# Patient Record
Sex: FEMALE | Race: WHITE | Employment: FULL TIME | URBAN - METROPOLITAN AREA
[De-identification: names, ages, dates, MRNs, and addresses within clinical notes are randomized per-mention and may not be internally consistent; named-entity substitution may affect disease eponyms.]

---

## 2020-06-15 LAB
CHLAMYDIA, EXTERNAL: NEGATIVE
HBSAG, EXTERNAL: NEGATIVE
HIV, EXTERNAL: NEGATIVE
N. GONORRHEA, EXTERNAL: NEGATIVE
RPR, EXTERNAL: NORMAL
RUBELLA, EXTERNAL: NORMAL

## 2020-07-01 ENCOUNTER — HOSPITAL ENCOUNTER (EMERGENCY)
Age: 30
Discharge: HOME | End: 2020-07-01
Attending: EMERGENCY MEDICINE
Payer: COMMERCIAL

## 2020-07-01 ENCOUNTER — APPOINTMENT (OUTPATIENT)
Dept: ULTRASOUND IMAGING | Age: 30
End: 2020-07-01
Attending: EMERGENCY MEDICINE
Payer: COMMERCIAL

## 2020-07-01 VITALS
WEIGHT: 190 LBS | OXYGEN SATURATION: 100 % | BODY MASS INDEX: 29.82 KG/M2 | TEMPERATURE: 98.2 F | SYSTOLIC BLOOD PRESSURE: 116 MMHG | RESPIRATION RATE: 18 BRPM | HEIGHT: 67 IN | DIASTOLIC BLOOD PRESSURE: 69 MMHG | HEART RATE: 83 BPM

## 2020-07-01 DIAGNOSIS — O41.8X10 SUBCHORIONIC HEMORRHAGE OF PLACENTA IN FIRST TRIMESTER, SINGLE OR UNSPECIFIED FETUS: ICD-10-CM

## 2020-07-01 DIAGNOSIS — O46.8X1 SUBCHORIONIC HEMORRHAGE OF PLACENTA IN FIRST TRIMESTER, SINGLE OR UNSPECIFIED FETUS: ICD-10-CM

## 2020-07-01 DIAGNOSIS — O46.90 VAGINAL BLEEDING DURING PREGNANCY: Primary | ICD-10-CM

## 2020-07-01 LAB
ABO + RH BLD: NORMAL
ALBUMIN SERPL-MCNC: 3.9 G/DL (ref 3.4–5)
ALBUMIN/GLOB SERPL: 1.3 {RATIO} (ref 0.8–1.7)
ALP SERPL-CCNC: 40 U/L (ref 45–117)
ALT SERPL-CCNC: 22 U/L (ref 13–56)
ANION GAP SERPL CALC-SCNC: 6 MMOL/L (ref 3–18)
APPEARANCE UR: CLEAR
AST SERPL-CCNC: 10 U/L (ref 10–38)
BACTERIA URNS QL MICRO: ABNORMAL /HPF
BASOPHILS # BLD: 0 K/UL (ref 0–0.1)
BASOPHILS NFR BLD: 0 % (ref 0–2)
BILIRUB SERPL-MCNC: 0.3 MG/DL (ref 0.2–1)
BILIRUB UR QL: NEGATIVE
BUN SERPL-MCNC: 11 MG/DL (ref 7–18)
BUN/CREAT SERPL: 16 (ref 12–20)
CALCIUM SERPL-MCNC: 8.8 MG/DL (ref 8.5–10.1)
CHLORIDE SERPL-SCNC: 105 MMOL/L (ref 100–111)
CO2 SERPL-SCNC: 26 MMOL/L (ref 21–32)
COLOR UR: YELLOW
CREAT SERPL-MCNC: 0.7 MG/DL (ref 0.6–1.3)
DIFFERENTIAL METHOD BLD: ABNORMAL
EOSINOPHIL # BLD: 0.2 K/UL (ref 0–0.4)
EOSINOPHIL NFR BLD: 2 % (ref 0–5)
EPITH CASTS URNS QL MICRO: ABNORMAL /LPF (ref 0–5)
ERYTHROCYTE [DISTWIDTH] IN BLOOD BY AUTOMATED COUNT: 12.4 % (ref 11.6–14.5)
GLOBULIN SER CALC-MCNC: 2.9 G/DL (ref 2–4)
GLUCOSE SERPL-MCNC: 83 MG/DL (ref 74–99)
GLUCOSE UR STRIP.AUTO-MCNC: NEGATIVE MG/DL
HCG SERPL-ACNC: ABNORMAL MIU/ML (ref 0–10)
HCG UR QL: POSITIVE
HCT VFR BLD AUTO: 35.7 % (ref 35–45)
HGB BLD-MCNC: 12 G/DL (ref 12–16)
HGB UR QL STRIP: ABNORMAL
KETONES UR QL STRIP.AUTO: NEGATIVE MG/DL
LEUKOCYTE ESTERASE UR QL STRIP.AUTO: NEGATIVE
LYMPHOCYTES # BLD: 2 K/UL (ref 0.9–3.6)
LYMPHOCYTES NFR BLD: 22 % (ref 21–52)
MCH RBC QN AUTO: 29.1 PG (ref 24–34)
MCHC RBC AUTO-ENTMCNC: 33.6 G/DL (ref 31–37)
MCV RBC AUTO: 86.7 FL (ref 74–97)
MONOCYTES # BLD: 0.5 K/UL (ref 0.05–1.2)
MONOCYTES NFR BLD: 6 % (ref 3–10)
NEUTS SEG # BLD: 6.1 K/UL (ref 1.8–8)
NEUTS SEG NFR BLD: 70 % (ref 40–73)
NITRITE UR QL STRIP.AUTO: NEGATIVE
PH UR STRIP: 5.5 [PH] (ref 5–8)
PLATELET # BLD AUTO: 192 K/UL (ref 135–420)
PMV BLD AUTO: 10.2 FL (ref 9.2–11.8)
POTASSIUM SERPL-SCNC: 3.4 MMOL/L (ref 3.5–5.5)
PROT SERPL-MCNC: 6.8 G/DL (ref 6.4–8.2)
PROT UR STRIP-MCNC: NEGATIVE MG/DL
RBC # BLD AUTO: 4.12 M/UL (ref 4.2–5.3)
RBC #/AREA URNS HPF: ABNORMAL /HPF (ref 0–5)
SERVICE CMNT-IMP: NORMAL
SODIUM SERPL-SCNC: 137 MMOL/L (ref 136–145)
SP GR UR REFRACTOMETRY: 1.02 (ref 1–1.03)
UROBILINOGEN UR QL STRIP.AUTO: 0.2 EU/DL (ref 0.2–1)
WBC # BLD AUTO: 8.7 K/UL (ref 4.6–13.2)
WBC URNS QL MICRO: ABNORMAL /HPF (ref 0–5)
WET PREP GENITAL: NORMAL

## 2020-07-01 PROCEDURE — 81001 URINALYSIS AUTO W/SCOPE: CPT

## 2020-07-01 PROCEDURE — 87210 SMEAR WET MOUNT SALINE/INK: CPT

## 2020-07-01 PROCEDURE — 87661 TRICHOMONAS VAGINALIS AMPLIF: CPT

## 2020-07-01 PROCEDURE — 85025 COMPLETE CBC W/AUTO DIFF WBC: CPT

## 2020-07-01 PROCEDURE — 99284 EMERGENCY DEPT VISIT MOD MDM: CPT

## 2020-07-01 PROCEDURE — 76817 TRANSVAGINAL US OBSTETRIC: CPT

## 2020-07-01 PROCEDURE — 86900 BLOOD TYPING SEROLOGIC ABO: CPT

## 2020-07-01 PROCEDURE — 84702 CHORIONIC GONADOTROPIN TEST: CPT

## 2020-07-01 PROCEDURE — 80053 COMPREHEN METABOLIC PANEL: CPT

## 2020-07-01 PROCEDURE — 81025 URINE PREGNANCY TEST: CPT

## 2020-07-01 NOTE — ED PROVIDER NOTES
EMERGENCY DEPARTMENT HISTORY AND PHYSICAL EXAM    Date: 2020  Patient Name: Alida Cochran    History of Presenting Illness     Chief Complaint   Patient presents with    Vaginal Bleeding         History Provided By: Patient    Additional History (Context):   Alida Cochran is a 34 y.o. female with PMHX G3, P2 proximally 8.5 weeks pregnant presents to the emergency department C/O heavy vaginal bleeding that started at 6 PM.  Patient states that she had a confirmed IUP by her OB/GYN at approximately 6 weeks. Last menstrual period was end of April. She reports crampy lower abdominal pain. Pt denies fever, nausea or vomiting, and any other sxs or complaints. PCP: Kristi Hutchins MD    Current Outpatient Medications   Medication Sig Dispense Refill    prenatal multivit-ca-min-fe-fa tab Take  by mouth. Indications: PREGNANCY      ibuprofen (MOTRIN) 800 mg tablet Take 1 Tab by mouth every eight (8) hours as needed. Indications: PAIN 90 Tab 0       Past History     Past Medical History:  History reviewed. No pertinent past medical history. Past Surgical History:  History reviewed. No pertinent surgical history. Family History:  Family History   Problem Relation Age of Onset    Cancer Paternal Grandfather        Social History:  Social History     Tobacco Use    Smoking status: Former Smoker     Last attempt to quit: 2015     Years since quittin.6    Smokeless tobacco: Never Used   Substance Use Topics    Alcohol use: No    Drug use: No       Allergies:  No Known Allergies      Review of Systems   Review of Systems   Constitutional: Negative for chills and fever. HENT: Negative for congestion, ear pain, sinus pain and sore throat. Eyes: Negative for pain and visual disturbance. Respiratory: Negative for cough and shortness of breath. Cardiovascular: Negative for chest pain and leg swelling.    Gastrointestinal: Negative for abdominal pain, constipation, diarrhea, nausea and vomiting. Genitourinary: Positive for vaginal bleeding. Negative for dysuria, hematuria and vaginal discharge. Musculoskeletal: Negative for back pain and neck pain. Skin: Negative for rash and wound. Neurological: Negative for dizziness, tremors, weakness, light-headedness and numbness. All other systems reviewed and are negative. Physical Exam     Vitals:    07/01/20 1850 07/01/20 2111   BP: 134/75 126/73   Pulse: 90 83   Resp: 16 18   Temp: 98.2 °F (36.8 °C)    SpO2: 100% 100%   Weight: 86.2 kg (190 lb)    Height: 5' 7\" (1.702 m)      Physical Exam    Nursing note and vitals reviewed    Constitutional: Tearful young female, nontoxic  Head: Normocephalic, Atraumatic  Eyes: Pupils are equal, round, and reactive to light, EOMI  Neck: Supple, non-tender  Cardiovascular: Regular rate and rhythm, no murmurs, rubs, or gallops, + 2 radial pulses bilaterally  Chest: Normal work of breathing and chest excursion bilaterally  Lungs: Clear to ausculation bilaterally, no wheezes, no rhonchi  Abdomen: Soft, non tender, non distended, normoactive bowel sounds  Exam was chaperoned by Ramonita Dasilva, medical technician  Normal external genitalia. Minimal to moderate blood in the vault. Normal cervical appearance. On bimanual exam, no cervical, uterine, or adnexal tenderness. Back: No evidence of trauma or deformity  Extremities: No evidence of trauma or deformity, no LE edema.  No streaking erythema, vesicular lesions, ulcerations or bulla  Skin: Warm and dry, normal cap refill  Neuro: Alert and appropriate, CN intact, normal speech, moving all 4 extremities freely and symmetrically  Psychiatric: Normal mood and affect       Diagnostic Study Results     Labs -     Recent Results (from the past 12 hour(s))   URINALYSIS W/ RFLX MICROSCOPIC    Collection Time: 07/01/20  7:15 PM   Result Value Ref Range    Color YELLOW      Appearance CLEAR      Specific gravity 1.019 1.005 - 1.030      pH (UA) 5.5 5.0 - 8.0      Protein Negative NEG mg/dL    Glucose Negative NEG mg/dL    Ketone Negative NEG mg/dL    Bilirubin Negative NEG      Blood MODERATE (A) NEG      Urobilinogen 0.2 0.2 - 1.0 EU/dL    Nitrites Negative NEG      Leukocyte Esterase Negative NEG     URINE MICROSCOPIC ONLY    Collection Time: 07/01/20  7:15 PM   Result Value Ref Range    WBC 2 to 5 0 - 5 /hpf    RBC 10 to 25 0 - 5 /hpf    Epithelial cells 1+ 0 - 5 /lpf    Bacteria FEW (A) NEG /hpf   BLOOD TYPE, (ABO+RH)    Collection Time: 07/01/20  7:20 PM   Result Value Ref Range    ABO/Rh(D) B POSITIVE    CBC WITH AUTOMATED DIFF    Collection Time: 07/01/20  7:20 PM   Result Value Ref Range    WBC 8.7 4.6 - 13.2 K/uL    RBC 4.12 (L) 4.20 - 5.30 M/uL    HGB 12.0 12.0 - 16.0 g/dL    HCT 35.7 35.0 - 45.0 %    MCV 86.7 74.0 - 97.0 FL    MCH 29.1 24.0 - 34.0 PG    MCHC 33.6 31.0 - 37.0 g/dL    RDW 12.4 11.6 - 14.5 %    PLATELET 370 011 - 507 K/uL    MPV 10.2 9.2 - 11.8 FL    NEUTROPHILS 70 40 - 73 %    LYMPHOCYTES 22 21 - 52 %    MONOCYTES 6 3 - 10 %    EOSINOPHILS 2 0 - 5 %    BASOPHILS 0 0 - 2 %    ABS. NEUTROPHILS 6.1 1.8 - 8.0 K/UL    ABS. LYMPHOCYTES 2.0 0.9 - 3.6 K/UL    ABS. MONOCYTES 0.5 0.05 - 1.2 K/UL    ABS. EOSINOPHILS 0.2 0.0 - 0.4 K/UL    ABS. BASOPHILS 0.0 0.0 - 0.1 K/UL    DF AUTOMATED     METABOLIC PANEL, COMPREHENSIVE    Collection Time: 07/01/20  7:20 PM   Result Value Ref Range    Sodium 137 136 - 145 mmol/L    Potassium 3.4 (L) 3.5 - 5.5 mmol/L    Chloride 105 100 - 111 mmol/L    CO2 26 21 - 32 mmol/L    Anion gap 6 3.0 - 18 mmol/L    Glucose 83 74 - 99 mg/dL    BUN 11 7.0 - 18 MG/DL    Creatinine 0.70 0.6 - 1.3 MG/DL    BUN/Creatinine ratio 16 12 - 20      GFR est AA >60 >60 ml/min/1.73m2    GFR est non-AA >60 >60 ml/min/1.73m2    Calcium 8.8 8.5 - 10.1 MG/DL    Bilirubin, total 0.3 0.2 - 1.0 MG/DL    ALT (SGPT) 22 13 - 56 U/L    AST (SGOT) 10 10 - 38 U/L    Alk.  phosphatase 40 (L) 45 - 117 U/L    Protein, total 6.8 6.4 - 8.2 g/dL    Albumin 3.9 3.4 - 5.0 g/dL Globulin 2.9 2.0 - 4.0 g/dL    A-G Ratio 1.3 0.8 - 1.7     BETA HCG, QT    Collection Time: 07/01/20  7:20 PM   Result Value Ref Range    Beta HCG, QT 53,676 (H) 0 - 10 MIU/ML   HCG URINE, QL. - POC    Collection Time: 07/01/20  7:32 PM   Result Value Ref Range    Pregnancy test,urine (POC) Positive (A) NEG     WET PREP    Collection Time: 07/01/20  7:45 PM   Result Value Ref Range    Special Requests: NO SPECIAL REQUESTS      Wet prep NO YEAST,TRICHOMONAS OR CLUE CELLS NOTED         Radiologic Studies -   US OB TV W DOPPLER   Final Result   IMPRESSION:       1. Single viable intrauterine gestation with an estimated gestational age based   on today's measurements of 9 weeks and 1 day plus/-1 week. 2. Small subchorionic hemorrhage. Recommend imaging follow-up as clinically   indicated. CT Results  (Last 48 hours)    None        CXR Results  (Last 48 hours)    None            Medical Decision Making   I am the first provider for this patient. I reviewed the vital signs, available nursing notes, past medical history, past surgical history, family history and social history. Vital Signs-Reviewed the patient's vital signs. Pulse Oximetry Analysis -98.2 % on room air    Cardiac Monitor:  Rate: 90 bpm  Rhythm: Regular    Records Reviewed: Nursing Notes and Old Medical Records    Provider Notes:   34 y.o. female G3, P2 approximately 8 weeks pregnant who presents with vaginal bleeding. She is tearful but nontoxic. Benign abdominal exam.  Had confirmed ultrasound with IUP 2 weeks ago. Pelvic exam showing minimal to moderate amount of blood. Will check Rh status. Obtain transvaginal ultrasound to evaluate. Procedures:  Procedures    ED Course:   7:06 PM   Initial assessment performed. The patients presenting problems have been discussed, and they are in agreement with the care plan formulated and outlined with them.   I have encouraged them to ask questions as they arise throughout their visit. 9:24 PM  Patient is B+. hCG quant is 53,000. Ultrasound showing single viable intrauterine gestation with approximate gestational age of 9-week. Fetal heart tones in the 170s. Noted to have a subchorionic hemorrhage. Discussed findings with patient and urged close follow-up with her OB/GYN. Diagnosis and Disposition       DISCHARGE NOTE:  9:23 PM    Olimpia Dailey  results have been reviewed with her. She has been counseled regarding her diagnosis, treatment, and plan. She verbally conveys understanding and agreement of the signs, symptoms, diagnosis, treatment and prognosis and additionally agrees to follow up as discussed. She also agrees with the care-plan and conveys that all of her questions have been answered. I have also provided discharge instructions for her that include: educational information regarding their diagnosis and treatment, and list of reasons why they would want to return to the ED prior to their follow-up appointment, should her condition change. She has been provided with education for proper emergency department utilization. CLINICAL IMPRESSION:    1. Vaginal bleeding during pregnancy    2. Subchorionic hemorrhage of placenta in first trimester, single or unspecified fetus        PLAN:  1. D/C Home  2. Current Discharge Medication List        3.    Follow-up Information     Follow up With Specialties Details Why Contact Info    Brii Hopkins MD Internal Medicine Schedule an appointment as soon as possible for a visit in 2 days  [place] [phone] [facility]  Schedule an appointment as soon as possible for a visit in 2 days  [address] [facility] [address]    THE [facility] EMERGENCY DEPT Emergency Medicine  As needed if symptoms worsen [address] [facility] [id number]  [phone]        ____________________________________ Please note that this dictation was completed with [email or web address], the computer voice recognition software. Quite often unanticipated grammatical, syntax, homophones, and other interpretive errors are inadvertently transcribed by the computer software. Please disregard these errors. Please excuse any errors that have escaped final proofreading.

## 2020-07-02 NOTE — DISCHARGE INSTRUCTIONS
You were seen and evaluated in the Emergency Department. Please understand that your work up is not all encompassing and you should follow up with your primary care physician for further management and continuity of care. Please return to Emergency Department or seek medical attention immediately if you have acute worsening in your symptoms or develop chest pain, shortness of breath, repeated vomiting, fever, altered level of consciousness, coughing up blood, or start sweating and feel clammy. If you were prescribed any medicine for home, please take as prescribed by your health-care provider. If you were given any follow-up appointments or numbers to call, please do so as instructed. Avoid any tobacco products or excessive alcohol. Patient Education        Vaginal Bleeding During Pregnancy: Care Instructions  Your Care Instructions     Many women have some vaginal bleeding when they are pregnant. In some cases, the bleeding is not serious. And there aren't any more problems with the pregnancy. But sometimes bleeding is a sign of a more serious problem. This is more common if the bleeding is heavy or painful. Examples of more serious problems include miscarriage, an ectopic pregnancy, or a problem with the placenta. You may have to see your doctor again to be sure everything is okay. You may also need more tests to find the cause of the bleeding. For some women, home treatment is all they need. But it depends on what is causing the bleeding. Be sure to tell your doctor if you have any new symptoms or if your symptoms get worse. The doctor has checked you carefully, but problems can develop later. If you notice any problems or new symptoms, get medical treatment right away. Follow-up care is a key part of your treatment and safety. Be sure to make and go to all appointments, and call your doctor if you are having problems.  It's also a good idea to know your test results and keep a list of the medicines you take. How can you care for yourself at home? · If your doctor prescribed medicines, take them exactly as directed. Call your doctor if you think you are having a problem with your medicine. · Do not have sex until the bleeding stops and your doctor says it's okay. · Ask your doctor about other activities you can or can't do. · Get a lot of rest. Being pregnant can make you tired. · Eat a healthy diet. Include a lot of peas, beans, and leafy green vegetables. Talk to a dietitian if you need help planning your diet. · Do not use nonsteroidal anti-inflammatory drugs (NSAIDs), such as ibuprofen (Advil, Motrin), naproxen (Aleve), or aspirin, unless your doctor says it is okay. When should you call for help? [id number] anytime you think you may need emergency care. For example, call if:  · You passed out (lost consciousness). · You have severe vaginal bleeding. Call your doctor now or seek immediate medical care if:  · You have any vaginal bleeding. · You have pain in your belly or pelvis. · You think that you are in labor. · You have a sudden release of fluid from your vagina. · You notice that your baby has stopped moving or is moving much less than normal.  Watch closely for changes in your health, and be sure to contact your doctor if you have any questions or concerns. Where can you learn more? Go to http://jolie-nathaniel.info/  Enter A600 in the search box to learn more about \"Vaginal Bleeding During Pregnancy: Care Instructions. \"  Current as of: February 11, 2020               Content Version: 12.5  © 4178-4910 Healthwise, Incorporated. Care instructions adapted under license by [employer] (which disclaims liability or warranty for this information). If you have questions about a medical condition or this instruction, always ask your healthcare professional. [address] any warranty or liability for your use of this information.        Patient Education        Subchorionic Hematoma: Care Instructions  Your Care Instructions     A subchorionic hematoma or hemorrhage is bleeding under one of the membranes (chorion) that surrounds the embryo inside the uterus. It is a common cause of bleeding in early pregnancy. The main symptom is vaginal bleeding. But some women don't have symptoms. They may find out they have a hematoma during an ultrasound test.  In most cases, the bleeding goes away on its own. Most women go on to have a healthy baby. But in some cases, the bleeding is a sign of a miscarriage or other problem with the pregnancy. Your doctor may want to do a follow-up ultrasound. Follow-up care is a key part of your treatment and safety. Be sure to make and go to all appointments, and call your doctor if you are having problems. It's also a good idea to know your test results and keep a list of the medicines you take. How can you care for yourself at home? · Keep track of any bleeding, and follow the guidelines for when to call your doctor. · Keep in mind that some bleeding during the first trimester or an abnormal finding on an ultrasound may:  ? Not cause any problems for you or the baby. ? Turn out to be something more serious. But if this happens, it's best to find out early. Then you and your doctor can manage any complications sooner rather than later. When should you call for help? [id number] anytime you think you may need emergency care. For example, call if:  · You have sudden, severe pain in your belly or pelvis. · You passed out (lost consciousness). · You have severe vaginal bleeding. Call your doctor now or seek immediate medical care if:  · You are dizzy or lightheaded, or you feel like you may faint. · You have new or increased pain in your belly or pelvis. · You have new or more vaginal bleeding. · You have pain in the vaginal area. · You have a fever. · You think you may have passed tissue. Save any tissue that you pass. Take it to your doctor's office as soon as you can. Watch closely for changes in your health, and be sure to contact your doctor if:  · You have new or worse vaginal symptoms, such as pain, itching, or a discharge. · You do not get better as expected. Where can you learn more? Go to http://jolie-nathaniel.info/  Enter U775 in the search box to learn more about \"Subchorionic Hematoma: Care Instructions. \"  Current as of: February 11, 2020               Content Version: 12.5  © 8237-9032 Healthwise, Incorporated. Care instructions adapted under license by [employer] (which disclaims liability or warranty for this information). If you have questions about a medical condition or this instruction, always ask your healthcare professional. [address] any warranty or liability for your use of this information.

## 2020-07-07 LAB
C TRACH RRNA SPEC QL NAA+PROBE: NEGATIVE
N GONORRHOEA RRNA SPEC QL NAA+PROBE: NEGATIVE
SPECIMEN SOURCE: NORMAL
T VAGINALIS RRNA SPEC QL NAA+PROBE: NEGATIVE

## 2021-01-19 LAB — GRBS, EXTERNAL: NEGATIVE

## 2021-02-03 ENCOUNTER — ANESTHESIA (OUTPATIENT)
Dept: LABOR AND DELIVERY | Age: 31
End: 2021-02-03
Payer: COMMERCIAL

## 2021-02-03 ENCOUNTER — ANESTHESIA EVENT (OUTPATIENT)
Dept: LABOR AND DELIVERY | Age: 31
End: 2021-02-03
Payer: COMMERCIAL

## 2021-02-03 ENCOUNTER — HOSPITAL ENCOUNTER (INPATIENT)
Age: 31
LOS: 2 days | Discharge: HOME | End: 2021-02-05
Attending: OBSTETRICS & GYNECOLOGY | Admitting: OBSTETRICS & GYNECOLOGY
Payer: COMMERCIAL

## 2021-02-03 PROBLEM — Z3A.39 39 WEEKS GESTATION OF PREGNANCY: Status: ACTIVE | Noted: 2021-02-03

## 2021-02-03 LAB
ABO + RH BLD: NORMAL
BASOPHILS # BLD: 0 K/UL (ref 0–0.1)
BASOPHILS NFR BLD: 0 % (ref 0–2)
BLOOD GROUP ANTIBODIES SERPL: NORMAL
DIFFERENTIAL METHOD BLD: ABNORMAL
EOSINOPHIL # BLD: 0.1 K/UL (ref 0–0.4)
EOSINOPHIL NFR BLD: 1 % (ref 0–5)
ERYTHROCYTE [DISTWIDTH] IN BLOOD BY AUTOMATED COUNT: 12.7 % (ref 11.6–14.5)
HCT VFR BLD AUTO: 38.3 % (ref 35–45)
HGB BLD-MCNC: 13 G/DL (ref 12–16)
LYMPHOCYTES # BLD: 1.3 K/UL (ref 0.9–3.6)
LYMPHOCYTES NFR BLD: 10 % (ref 21–52)
MCH RBC QN AUTO: 29 PG (ref 24–34)
MCHC RBC AUTO-ENTMCNC: 33.9 G/DL (ref 31–37)
MCV RBC AUTO: 85.3 FL (ref 74–97)
MONOCYTES # BLD: 0.7 K/UL (ref 0.05–1.2)
MONOCYTES NFR BLD: 5 % (ref 3–10)
NEUTS SEG # BLD: 11.2 K/UL (ref 1.8–8)
NEUTS SEG NFR BLD: 84 % (ref 40–73)
PLATELET # BLD AUTO: 230 K/UL (ref 135–420)
PMV BLD AUTO: 10.8 FL (ref 9.2–11.8)
RBC # BLD AUTO: 4.49 M/UL (ref 4.2–5.3)
SPECIMEN EXP DATE BLD: NORMAL
WBC # BLD AUTO: 13.3 K/UL (ref 4.6–13.2)

## 2021-02-03 PROCEDURE — 85025 COMPLETE CBC W/AUTO DIFF WBC: CPT

## 2021-02-03 PROCEDURE — 4A1HX4Z MONITORING OF PRODUCTS OF CONCEPTION, CARDIAC ELECTRICAL ACTIVITY, EXTERNAL APPROACH: ICD-10-PCS | Performed by: OBSTETRICS & GYNECOLOGY

## 2021-02-03 PROCEDURE — 74011250637 HC RX REV CODE- 250/637: Performed by: ADVANCED PRACTICE MIDWIFE

## 2021-02-03 PROCEDURE — 75410000000 HC DELIVERY VAGINAL/SINGLE

## 2021-02-03 PROCEDURE — 77030007879 HC KT SPN EPDRL TELE -B: Performed by: ANESTHESIOLOGY

## 2021-02-03 PROCEDURE — 65270000029 HC RM PRIVATE

## 2021-02-03 PROCEDURE — 74011250636 HC RX REV CODE- 250/636: Performed by: ANESTHESIOLOGY

## 2021-02-03 PROCEDURE — 74011000250 HC RX REV CODE- 250: Performed by: ANESTHESIOLOGY

## 2021-02-03 PROCEDURE — 86901 BLOOD TYPING SEROLOGIC RH(D): CPT

## 2021-02-03 PROCEDURE — 76060000078 HC EPIDURAL ANESTHESIA

## 2021-02-03 PROCEDURE — 00HU33Z INSERTION OF INFUSION DEVICE INTO SPINAL CANAL, PERCUTANEOUS APPROACH: ICD-10-PCS | Performed by: ANESTHESIOLOGY

## 2021-02-03 PROCEDURE — 75410000003 HC RECOV DEL/VAG/CSECN EA 0.5 HR

## 2021-02-03 PROCEDURE — 75410000002 HC LABOR FEE PER 1 HR

## 2021-02-03 PROCEDURE — 74011250636 HC RX REV CODE- 250/636: Performed by: ADVANCED PRACTICE MIDWIFE

## 2021-02-03 RX ORDER — NALOXONE HYDROCHLORIDE 0.4 MG/ML
0.2 INJECTION, SOLUTION INTRAMUSCULAR; INTRAVENOUS; SUBCUTANEOUS AS NEEDED
Status: DISCONTINUED | OUTPATIENT
Start: 2021-02-03 | End: 2021-02-03 | Stop reason: HOSPADM

## 2021-02-03 RX ORDER — SODIUM CHLORIDE 0.9 % (FLUSH) 0.9 %
5-40 SYRINGE (ML) INJECTION EVERY 8 HOURS
Status: DISCONTINUED | OUTPATIENT
Start: 2021-02-03 | End: 2021-02-03 | Stop reason: HOSPADM

## 2021-02-03 RX ORDER — OXYTOCIN/RINGER'S LACTATE 30/500 ML
10 PLASTIC BAG, INJECTION (ML) INTRAVENOUS AS NEEDED
Status: COMPLETED | OUTPATIENT
Start: 2021-02-03 | End: 2021-02-03

## 2021-02-03 RX ORDER — BUTORPHANOL TARTRATE 2 MG/ML
2 INJECTION INTRAMUSCULAR; INTRAVENOUS
Status: DISCONTINUED | OUTPATIENT
Start: 2021-02-03 | End: 2021-02-03 | Stop reason: HOSPADM

## 2021-02-03 RX ORDER — AMOXICILLIN 250 MG
1 CAPSULE ORAL
Status: DISCONTINUED | OUTPATIENT
Start: 2021-02-03 | End: 2021-02-05 | Stop reason: HOSPADM

## 2021-02-03 RX ORDER — DIPHENHYDRAMINE HYDROCHLORIDE 50 MG/ML
25 INJECTION, SOLUTION INTRAMUSCULAR; INTRAVENOUS
Status: DISCONTINUED | OUTPATIENT
Start: 2021-02-03 | End: 2021-02-03 | Stop reason: HOSPADM

## 2021-02-03 RX ORDER — LIDOCAINE HYDROCHLORIDE 10 MG/ML
20 INJECTION, SOLUTION EPIDURAL; INFILTRATION; INTRACAUDAL; PERINEURAL AS NEEDED
Status: DISCONTINUED | OUTPATIENT
Start: 2021-02-03 | End: 2021-02-03 | Stop reason: HOSPADM

## 2021-02-03 RX ORDER — TERBUTALINE SULFATE 1 MG/ML
0.25 INJECTION SUBCUTANEOUS
Status: DISCONTINUED | OUTPATIENT
Start: 2021-02-03 | End: 2021-02-03 | Stop reason: HOSPADM

## 2021-02-03 RX ORDER — PROMETHAZINE HYDROCHLORIDE 25 MG/ML
25 INJECTION, SOLUTION INTRAMUSCULAR; INTRAVENOUS
Status: DISCONTINUED | OUTPATIENT
Start: 2021-02-03 | End: 2021-02-05 | Stop reason: HOSPADM

## 2021-02-03 RX ORDER — METHYLERGONOVINE MALEATE 0.2 MG/ML
0.2 INJECTION INTRAVENOUS AS NEEDED
Status: DISCONTINUED | OUTPATIENT
Start: 2021-02-03 | End: 2021-02-03 | Stop reason: HOSPADM

## 2021-02-03 RX ORDER — ACETAMINOPHEN 325 MG/1
650 TABLET ORAL
Status: DISCONTINUED | OUTPATIENT
Start: 2021-02-03 | End: 2021-02-05 | Stop reason: HOSPADM

## 2021-02-03 RX ORDER — NALBUPHINE HYDROCHLORIDE 10 MG/ML
10 INJECTION, SOLUTION INTRAMUSCULAR; INTRAVENOUS; SUBCUTANEOUS
Status: DISCONTINUED | OUTPATIENT
Start: 2021-02-03 | End: 2021-02-03 | Stop reason: HOSPADM

## 2021-02-03 RX ORDER — SODIUM CHLORIDE 0.9 % (FLUSH) 0.9 %
5-40 SYRINGE (ML) INJECTION AS NEEDED
Status: DISCONTINUED | OUTPATIENT
Start: 2021-02-03 | End: 2021-02-03 | Stop reason: HOSPADM

## 2021-02-03 RX ORDER — FENTANYL CITRATE 50 UG/ML
INJECTION, SOLUTION INTRAMUSCULAR; INTRAVENOUS AS NEEDED
Status: DISCONTINUED | OUTPATIENT
Start: 2021-02-03 | End: 2021-02-03 | Stop reason: HOSPADM

## 2021-02-03 RX ORDER — LIDOCAINE HYDROCHLORIDE AND EPINEPHRINE 15; 5 MG/ML; UG/ML
INJECTION, SOLUTION EPIDURAL AS NEEDED
Status: DISCONTINUED | OUTPATIENT
Start: 2021-02-03 | End: 2021-02-03 | Stop reason: HOSPADM

## 2021-02-03 RX ORDER — FENTANYL/ROPIVACAINE/NS/PF 2MCG/ML-.1
1-22 PLASTIC BAG, INJECTION (ML) EPIDURAL
Status: DISCONTINUED | OUTPATIENT
Start: 2021-02-03 | End: 2021-02-03 | Stop reason: HOSPADM

## 2021-02-03 RX ORDER — SODIUM CHLORIDE, SODIUM LACTATE, POTASSIUM CHLORIDE, CALCIUM CHLORIDE 600; 310; 30; 20 MG/100ML; MG/100ML; MG/100ML; MG/100ML
125 INJECTION, SOLUTION INTRAVENOUS CONTINUOUS
Status: DISCONTINUED | OUTPATIENT
Start: 2021-02-03 | End: 2021-02-03 | Stop reason: HOSPADM

## 2021-02-03 RX ORDER — ZOLPIDEM TARTRATE 5 MG/1
5 TABLET ORAL
Status: DISCONTINUED | OUTPATIENT
Start: 2021-02-03 | End: 2021-02-05 | Stop reason: HOSPADM

## 2021-02-03 RX ORDER — PHENYLEPHRINE HCL IN 0.9% NACL 1 MG/10 ML
80 SYRINGE (ML) INTRAVENOUS AS NEEDED
Status: DISCONTINUED | OUTPATIENT
Start: 2021-02-03 | End: 2021-02-03 | Stop reason: HOSPADM

## 2021-02-03 RX ORDER — FENTANYL CITRATE 50 UG/ML
100 INJECTION, SOLUTION INTRAMUSCULAR; INTRAVENOUS ONCE
Status: COMPLETED | OUTPATIENT
Start: 2021-02-03 | End: 2021-02-03

## 2021-02-03 RX ORDER — HYDROMORPHONE HYDROCHLORIDE 1 MG/ML
1 INJECTION, SOLUTION INTRAMUSCULAR; INTRAVENOUS; SUBCUTANEOUS
Status: DISCONTINUED | OUTPATIENT
Start: 2021-02-03 | End: 2021-02-03 | Stop reason: HOSPADM

## 2021-02-03 RX ORDER — IBUPROFEN 400 MG/1
800 TABLET ORAL EVERY 8 HOURS
Status: DISCONTINUED | OUTPATIENT
Start: 2021-02-03 | End: 2021-02-05 | Stop reason: HOSPADM

## 2021-02-03 RX ORDER — NALBUPHINE HYDROCHLORIDE 10 MG/ML
2.5 INJECTION, SOLUTION INTRAMUSCULAR; INTRAVENOUS; SUBCUTANEOUS
Status: DISCONTINUED | OUTPATIENT
Start: 2021-02-03 | End: 2021-02-03 | Stop reason: HOSPADM

## 2021-02-03 RX ORDER — OXYTOCIN/0.9 % SODIUM CHLORIDE 30/500 ML
87.3 PLASTIC BAG, INJECTION (ML) INTRAVENOUS AS NEEDED
Status: DISCONTINUED | OUTPATIENT
Start: 2021-02-03 | End: 2021-02-03 | Stop reason: HOSPADM

## 2021-02-03 RX ORDER — MINERAL OIL
30 OIL (ML) ORAL AS NEEDED
Status: COMPLETED | OUTPATIENT
Start: 2021-02-03 | End: 2021-02-03

## 2021-02-03 RX ADMIN — MINERAL OIL 30 ML: 1000 SOLUTION ORAL at 12:55

## 2021-02-03 RX ADMIN — SODIUM CHLORIDE, SODIUM LACTATE, POTASSIUM CHLORIDE, AND CALCIUM CHLORIDE 1000 ML: 600; 310; 30; 20 INJECTION, SOLUTION INTRAVENOUS at 11:30

## 2021-02-03 RX ADMIN — LIDOCAINE HYDROCHLORIDE,EPINEPHRINE BITARTRATE 5 ML: 15; .005 INJECTION, SOLUTION EPIDURAL; INFILTRATION; INTRACAUDAL; PERINEURAL at 12:02

## 2021-02-03 RX ADMIN — IBUPROFEN 800 MG: 400 TABLET, FILM COATED ORAL at 23:12

## 2021-02-03 RX ADMIN — FENTANYL CITRATE 100 MCG: 50 INJECTION, SOLUTION INTRAMUSCULAR; INTRAVENOUS at 11:58

## 2021-02-03 RX ADMIN — SODIUM CHLORIDE, SODIUM LACTATE, POTASSIUM CHLORIDE, AND CALCIUM CHLORIDE 125 ML/HR: 600; 310; 30; 20 INJECTION, SOLUTION INTRAVENOUS at 11:15

## 2021-02-03 RX ADMIN — FENTANYL CITRATE 100 MCG: 50 INJECTION, SOLUTION INTRAMUSCULAR; INTRAVENOUS at 12:03

## 2021-02-03 RX ADMIN — Medication 15 ML/HR: at 12:04

## 2021-02-03 RX ADMIN — Medication 10000 MILLI-UNITS: at 13:10

## 2021-02-03 NOTE — PROGRESS NOTES
1100:   39+4 weeks gestation ambulated to unit for Contractions q5 minutes at home. Patient taken to Bedroom 4 and oriented to call bell, provided with gown VS taken, and pt placed on the monitor. 1115: JIM Arroyo at bedside SVE 7/90/-2  PIV started in left wrist. Pt requests epidural. Labs sent down. Consents signed  Dr Bong Mann notified. 1155: Dr. Bong Mann At bedside discussing epidural with patient. Patient verbalized understanding and agreement. Consent form Signed. 1158: Time out completed. 1201: Epidural catheter placed. 1158: Fentanyl vial handed to anesthesiologist for loading dose administration. 1202: Test dose. 1203: Load dose. 1204: Epidural connected and infusion started (see MAR). 1205: Patient lying down in bed.      1220: Pt repositioned to right lateral with peanut ball. 1250: JIM Arroyo at bedside SVE complete AROM small amount clear fluid    1255: Start pushing    1259: Viable baby girl delivered vaginally  Tactile stim, Bulb syringe. Baby placed skin to skin with mother    0: Pit started. 1311: Placenta delivered intact    1315: Fundus firm 3 below umbilicus scant lochia    1330: Fundus firm and 3 below umbilicus small lochia    1340: Epidural catheter removed. Tip intact. 1910: Missy care done. Pad changed. Fundus firm 3 below umbilicus with scant lochia    1930: Bedside shift change report given to [facility] rn (oncoming nurse) by JIM Don RN (offgoing nurse). Report included the following information SBAR, Kardex, Intake/Output, MAR and Recent Results.

## 2021-02-03 NOTE — LACTATION NOTE
Infant latched and nursing well at [phone]. Mom educated on breastfeeding basics--hunger cues, feeding on demand, waking baby if baby sleeps too long between feeds, importance of skin to skin, positioning and latching, risk of pacifier use and supplemental feedings, and importance of rooming in--and use of log sheet. Mom also educated on benefits of breastfeeding for herself and baby. Mom verbalized understanding. No questions at this time.

## 2021-02-03 NOTE — H&P
Obstetrical History and Physical    Subjective:     Date of Admission: 2/3/2021    Patient is a 31 y.o.   female admitted with pregnancy at 39+4 for active labor. Her pregnancy has been uncomplicated. She is GBS negative. For Obstetric history, see andrea.    For Review of Systems, see prenatal    Past Medical History:   Diagnosis Date    Psychiatric problem     anxiety      No past surgical history on file. Prior to Admission medications    Medication Sig Start Date End Date Taking? Authorizing Provider   ibuprofen (MOTRIN) 800 mg tablet Take 1 Tab by mouth every eight (8) hours as needed. Indications: PAIN 16   Teddy Lloyd CNM   prenatal multivit-ca-min-fe-fa tab Take  by mouth. Indications: PREGNANCY    Provider, Historical     No Known Allergies   Social History     Tobacco Use    Smoking status: Former Smoker     Quit date: 2015     Years since quittin.2    Smokeless tobacco: Never Used   Substance Use Topics    Alcohol use: No      Family History   Problem Relation Age of Onset    Cancer Paternal Grandfather       Objective:     unknown if currently breastfeeding. No data recorded. No intake/output data recorded. No intake/output data recorded. HEENT: No pallor, no jaundice, Thyroid and throat normal  RESPIRATORY: Clear to A & P  CVS: pulse reg, HS normal  ABDOMEN: Gravid. Vertex. EFW=7lb +-1lb. No abnormal tenderness. Pelvic: Cervix 7,   Effaced: 90%  Station:  -2  Data Review:   No results found for this or any previous visit (from the past 24 hour(s)). Monitor:  Reactivity:present Variability:present Baseline:within normal limits    Assessment:     Active Problems:     IUP (intrauterine pregnancy), incidental (2/3/2021)     39 weeks gestation of pregnancy (2/3/2021)     Plan:   Admit to L&D  Anticipate     Check labs:  CBC, T&S    Type of admit:In-Patient    I saw and examined patient. Dr. Jayson Watson aware of admission and plan of care.     Signed By: Edgardo Levi, [facility]                         February 3, 2021

## 2021-02-03 NOTE — LACTATION NOTE
This note was copied from a baby's chart. [address] per mom, infant latching and nursing well, but has been sleepy. Discussed DOL and the benefit of skin to skin. Encouraged mom to place  skin to skin after finishing dinner. Mom verbalized understanding. Encouraged to call for assistance as needed.

## 2021-02-03 NOTE — ANESTHESIA PROCEDURE NOTES
Epidural Block    Patient location during procedure: OB  Start time: 2/3/2021 11:58 AM  End time: 2/3/2021 12:04 PM  Reason for block: labor epidural  Staffing  Performed: attending   Anesthesiologist: Darlene Turner MD  Preanesthetic Checklist  Completed: patient identified, IV checked, site marked, risks and benefits discussed, surgical consent, monitors and equipment checked, pre-op evaluation and timeout performed  Block Placement  Patient position: sitting  Prep: ChloraPrep  Sterility prep: cap, drape, gloves, mask and hand  Sedation level: no sedation  Patient monitoring: continuous pulse oximetry and frequent blood pressure checks  Approach: midline  Location: lumbar  Lumbar location: L3-L4  Epidural  Loss of resistance technique: saline  Guidance: landmark technique  Needle  Needle type: Tuohy   Needle gauge: 17 G  Needle length: 9 cm  Needle insertion depth: 6 cm  Catheter type: end hole  Catheter size: 20 G  Catheter at skin depth: 11 cm  Catheter securement method: clear occlusive dressing and surgical tape  Test dose: negative  Assessment  Block outcome: pain improved  Number of attempts: 1  Procedure assessment: patient tolerated procedure well with no complications

## 2021-02-03 NOTE — L&D DELIVERY NOTE
Delivery Summary    Patient: Holly Tavares MRN: [id number]  SSN: [id number]    YOB: 1990  Age: 31 y.o. Sex: female       Information for the patient's :  Enedina Hansen [[id number]]       Labor Events:    Labor: No    Steroids: None   Cervical Ripening Date/Time:       Cervical Ripening Type: None   Antibiotics During Labor: No   Rupture Identifier:      Rupture Date/Time: 2/3/2021 12:53 PM   Rupture Type: AROM   Amniotic Fluid Volume: Scant    Amniotic Fluid Description: Clear    Amniotic Fluid Odor:      Induction: None       Induction Date/Time:        Indications for Induction:      Augmentation: None   Augmentation Date/Time:      Indications for Augmentation:     Labor complications: None       Additional complications:        Delivery Events:  Indications For Episiotomy:     Episiotomy: None   Perineal Laceration(s): None   Repaired:     Periurethral Laceration Location:      Repaired:     Labial Laceration Location:     Repaired:     Sulcal Laceration Location:     Repaired:     Vaginal Laceration Location:     Repaired:     Cervical Laceration Location:     Repaired:     Repair Suture: None   Number of Repair Packets:     Estimated Blood Loss (ml): 100ml   Quantitative Blood Loss (ml)                Delivery Date: 2/3/2021    Delivery Time: 12:59 PM  Delivery Type: Vaginal, Spontaneous  Sex:  Female    Gestational Age: 43w3d   Delivery Clinician:  Gaby José  Living Status: Living   Delivery Location: L&D            APGARS  One minute Five minutes Ten minutes   Skin color: 0   1        Heart rate: 2   2        Grimace: 2   2        Muscle tone: 2   2        Breathin   2        Totals: 8   9            Presentation: Vertex    Position: Left Occiput    Resuscitation Method:  Suctioning-bulb; Tactile Stimulation     Meconium Stained: None      Cord Information: 3 Vessels  Complications: None  Cord around:    Delayed cord clamping?  Yes  Cord clamped date/time:2/3/2021  1:02 PM  Disposition of Cord Blood: Lab    Blood Gases Sent?: No    Placenta:  Date/Time: 2/3/2021  1:11 PM  Removal: Spontaneous      Appearance: Intact      Measurements:  Birth Weight:        Birth Length:        Head Circumference:        Chest Circumference:       Abdominal Girth: Other Providers:   ;Cris Craft, Obstetrician;Primary Nurse;Primary [place] Nurse;Nicu Nurse;Neonatologist;Anesthesiologist;Crna;Nurse Practitioner;Midwife;Nursery Nurse         Group B Strep:   Lab Results   Component Value Date/Time    GrBStrep, External Negative 2021     Information for the patient's :  Darline Montiel [[id number]]   No results found for: ABORH, PCTABR, PCTDIG, BILI, ABORHEXT, ABORH     No results for input(s): PCO2CB, PO2CB, HCO3I, SO2I, IBD, PTEMPI, SPECTI, PHICB, ISITE, IDEV, IALLEN in the last 72 hours.      Vaginal Delivery Procedure Note    Name: Janeen Parsons Record Number: [id number]   YOB: 1990  Today's Date: February 3, 2021        Procedure: VAGINAL DELIVERY without suction or forceps     Anesthesia:  epidural    Extra Procedure Details: compound right hand     Estimated Blood Loss: 100    Fetal Description: barajas female     Anterior shoulder: left    Umbilical Cord: 3 vessels present and Cord blood sent to lab for type, Rh, and Marielena' test    Episiotomy: no   Tear: no             Cord Blood Results:   Information for the patient's :  Darline Montiel [[id number]]   @ABG@       Birth Information:   Information for the patient's :  Darline Montiel [[id number]]        Apgars 8, 9 at 1 and 5 min respectively    Specimens: Placenta was not sent     Placenta: Spontaneous with assist and apparently intact on exam          Complications:  precipitous delivery     Birth Weight: see baby part of chart    Mother's Condition: good  Baby's Condition: stable  Sponge and needle count: correct    I was present for the delivery. Delivered for our practice.     Signed: Ines Worley CNM      February 3, 2021

## 2021-02-03 NOTE — ANESTHESIA PREPROCEDURE EVALUATION
Relevant Problems   No relevant active problems       Anesthetic History   No history of anesthetic complications            Review of Systems / Medical History  Patient summary reviewed, nursing notes reviewed and pertinent labs reviewed    Pulmonary                   Neuro/Psych   Within defined limits           Cardiovascular                  Exercise tolerance: >4 METS     GI/Hepatic/Renal                Endo/Other  Within defined limits           Other Findings              Physical Exam    Airway  Mallampati: II  TM Distance: 4 - 6 cm  Neck ROM: normal range of motion   Mouth opening: Normal     Cardiovascular  Regular rate and rhythm,  S1 and S2 normal,  no murmur, click, rub, or gallop             Dental  No notable dental hx       Pulmonary  Breath sounds clear to auscultation               Abdominal  GI exam deferred       Other Findings            Anesthetic Plan    ASA: 2    Risk and benefits fully explained to the patient including bleeding, headache, nerve damage, infection, nausea, back pain, and hemodynamic changes. Patient understands and agrees to the procedure.     Post-op pain plan if not by surgeon: indwelling epidural catheter    Induction: Intravenous  Anesthetic plan and risks discussed with: Patient

## 2021-02-04 LAB
HCT VFR BLD AUTO: 35 % (ref 35–45)
HGB BLD-MCNC: 12 G/DL (ref 12–16)

## 2021-02-04 PROCEDURE — 65270000029 HC RM PRIVATE

## 2021-02-04 PROCEDURE — 36415 COLL VENOUS BLD VENIPUNCTURE: CPT

## 2021-02-04 PROCEDURE — 85018 HEMOGLOBIN: CPT

## 2021-02-04 PROCEDURE — 85014 HEMATOCRIT: CPT

## 2021-02-04 PROCEDURE — 74011250637 HC RX REV CODE- 250/637: Performed by: ADVANCED PRACTICE MIDWIFE

## 2021-02-04 RX ORDER — IBUPROFEN 800 MG/1
800 TABLET ORAL
Qty: 40 TAB | Refills: 0 | Status: SHIPPED | OUTPATIENT
Start: 2021-02-04

## 2021-02-04 RX ADMIN — IBUPROFEN 800 MG: 400 TABLET, FILM COATED ORAL at 15:41

## 2021-02-04 RX ADMIN — IBUPROFEN 800 MG: 400 TABLET, FILM COATED ORAL at 23:56

## 2021-02-04 RX ADMIN — IBUPROFEN 800 MG: 400 TABLET, FILM COATED ORAL at 07:51

## 2021-02-04 NOTE — PROGRESS NOTES
2015 TRANSFER - OUT REPORT:    Verbal report given to MELA Carbajal RN(name) on Jonnathan Joseph  being transferred to CLARENCE Kimbrough RN(unit) for routine progression of care       Report consisted of patients Situation, Background, Assessment and   Recommendations(SBAR). Information from the following report(s) SBAR was reviewed with the receiving nurse. Lines:   Peripheral IV 02/03/21 Left Wrist (Active)   Site Assessment Clean, dry, & intact 02/03/21 2000   Phlebitis Assessment 0 02/03/21 2000   Infiltration Assessment 0 02/03/21 2000   Dressing Status Clean, dry, & intact 02/03/21 2000   Dressing Type Tape;Transparent 02/03/21 2000   Hub Color/Line Status Flushed;Patent;Capped 02/03/21 2000   Action Taken Open ports on tubing capped 02/03/21 2000   Alcohol Cap Used Yes 02/03/21 2000        Opportunity for questions and clarification was provided.       Patient transported with:   Registered Nurse

## 2021-02-04 NOTE — ANESTHESIA POSTPROCEDURE EVALUATION
2/4/2021  1:31 PM    Laboring Epidural Follow-up Note       Referring physician: Lilli Kwong,*   Patient status post vaginal delivery with labor epidural.      Visit Vitals  /78 (BP 1 Location: Right upper arm, BP Patient Position: At rest)   Pulse 82   Temp 36.6 °C (97.8 °F)   Resp 17   Ht 5' 7\" (1.702 m)   Wt 108.9 kg (240 lb)   SpO2 99%   Breastfeeding Unknown   BMI 37.59 kg/m²       Patient ambulating and voiding without difficulty. Patient denies headache. Patient denies backache.     Sarah Pagan CRNA

## 2021-02-04 NOTE — PROGRESS NOTES
Report received from MELA Nuñez, [facility] [place] Assessed patient. Discussed pain management, warning signs, discharge plans. Discussed acquiring a pediatric appointment. Discussed motrin and medication side effects. Took out IV. Patient bled for several minutes. I held pressure for 4 minutes and recovered the wound. Patient, , and baby walking on unit. Baby is in bassinet. Rounded on patient throughout day. 1520 Bedside shift change report given to Ankit Wallace RN (oncoming nurse) by José Antonio Youngblood RN (offgoing nurse). Report included the following information SBAR, Procedure Summary, Intake/Output, MAR and Recent Results.

## 2021-02-04 NOTE — PROGRESS NOTES
1925- Bedside and Verbal shift change report given to ROBERT Fernandez, RN and CLARENCE Baldwin RN (oncoming nurse) by Nava Verduzco. Zackary Gonsalves (offgoing nurse). Report included the following information SBAR, Kardex, Intake/Output, MAR and Recent Results.

## 2021-02-05 VITALS
RESPIRATION RATE: 18 BRPM | HEIGHT: 67 IN | TEMPERATURE: 98.4 F | WEIGHT: 240 LBS | HEART RATE: 99 BPM | OXYGEN SATURATION: 98 % | DIASTOLIC BLOOD PRESSURE: 80 MMHG | SYSTOLIC BLOOD PRESSURE: 127 MMHG | BODY MASS INDEX: 37.67 KG/M2

## 2021-02-05 PROCEDURE — 74011250637 HC RX REV CODE- 250/637: Performed by: ADVANCED PRACTICE MIDWIFE

## 2021-02-05 RX ADMIN — IBUPROFEN 800 MG: 400 TABLET, FILM COATED ORAL at 07:55

## 2021-02-05 RX ADMIN — DOCUSATE SODIUM 50 MG AND SENNOSIDES 8.6 MG 1 TABLET: 8.6; 5 TABLET, FILM COATED ORAL at 09:30

## 2021-02-05 RX ADMIN — ACETAMINOPHEN 650 MG: 325 TABLET ORAL at 05:49

## 2021-02-05 NOTE — PROGRESS NOTES
Progress Note    Patient: Columba Palacios MRN: [id number]     YOB: 1990  Age: 31 y.o. Subjective:     Postpartum Day: 1    The patient is feeling well. Pain is  well controlled with current medications. Baby is feeding via breast without difficulty. Urinary output is adequate. Objective:      No data found. General:    alert, cooperative   Lochia:  appropriate   Uterine Fundus:   firm @ umbilicus    Perineum:  Intact    DVT Evaluation:  No evidence of DVT seen on physical exam.     Lab/Data Review:  Recent Results (from the past 24 hour(s))   HEMOGLOBIN    Collection Time: 21  3:25 AM   Result Value Ref Range    HGB 12.0 12.0 - 16.0 g/dL   HEMATOCRIT    Collection Time: 21  3:25 AM   Result Value Ref Range    HCT 35.0 35.0 - 45.0 %     All lab results for the last 24 hours reviewed. Assessment:     Delivery:     Plan:     Doing well postpartum vaginal delivery. Continue current postpartum care. Encouraged hydration, nutrition and ambulation. DC home tomorrow. Follow-up in office in 6 weeks. Call prn. Current Discharge Medication List      CONTINUE these medications which have CHANGED    Details   ibuprofen (MOTRIN) 800 mg tablet Take 1 Tab by mouth every eight (8) hours as needed for Pain. Indications: pain  Qty: 40 Tab, Refills: 0         CONTINUE these medications which have NOT CHANGED    Details   prenatal multivit-ca-min-fe-fa tab Take  by mouth. Indications: PREGNANCY             Reviewed education: S/sx of DVT, mastitis, and pp depression. Also reviewed reasons to call including DVT, issues with breasts, fever above 101, pp depression, increased bleeding (pad/hr) after rest. Included continuing good hydration at home, diet, rest when baby is sleeping, and pericare.      Signed By: Jocy Paredes CNM     2021

## 2021-02-05 NOTE — LACTATION NOTE
This note was copied from a baby's chart. 80 per mom, infant latching and nursing well. DOL behaviors were discussed. Mom verbalized understanding. Breastfeeding discharge teaching completed to include feeding on demand, foremilk and hindmilk importance, engorgement, mastitis, clogged ducts, pumping, breastmilk storage, and returning to work. Information given about unit and office phone numbers and encouraged mom to reach out if concerns arise, but that [facility] would be calling her in the next few days to follow up on breastfeeding. Mom verbalized understanding and no questions at this time.

## 2021-02-05 NOTE — DISCHARGE INSTRUCTIONS
POST DELIVERY DISCHARGE INSTRUCTIONS    Name: Yesi Williamson  YOB: 1990  Primary Diagnosis: Principal Problem:    IUP (intrauterine pregnancy), incidental (7/19/2016)    Active Problems:    39 weeks gestation of pregnancy (2/3/2021)      Postpartum care following vaginal delivery (2/4/2021)        General:     Diet/Diet Restrictions:  Eight 8-ounce glasses of fluid daily (water, juices); avoid excessive caffeine intake. Meals/snacks as desired which are high in fiber and carbohydrates and low in fat and cholesterol. Physical Activity / Restrictions / Safety:     Avoid heavy lifting, no more than the baby alone (not the baby in the car seat). Avoid intercourse until you are seen at your postpartum visit. No douching or tampon use. Check with obstetrician before starting or resuming an exercise program.         Discharge Instructions/Special Treatment/Home Care Needs:     Continue prenatal vitamins. Continue to use squirt bottle with warm water on your perineum after each bathroom use until bleeding stops. Call your doctor for the following:     Fever over 101 degrees by mouth. Vaginal bleeding that soaks two pads per hour for more than one hour. Red streaks or increased swelling of legs, painful red streaks on your breast.  If you feel extremely anxious or overwhelmed. If you have thoughts of harming yourself and/or your baby. Pain Management:     Pain Management:   Take Acetaminophen (Tylenol) or Ibuprofen (Advil, Motrin), as directed for pain. Use a warm Sitz bath 3 times daily to relieve perineal or hemorrhoidal discomfort. For hemorrhoidal discomfort, use Tucks and Anusol cream as needed and directed.     Follow-Up Care:     Appointment with MD:   Follow-up Appointments   Procedures    FOLLOW UP VISIT Appointment in: 6 Weeks     Standing Status:   Standing     Number of Occurrences:   1     Order Specific Question:   Appointment in     Answer:   6 Weeks     Telephone number: [phone]      Signed By: Ubaldo Dean CNM

## 2021-02-05 NOTE — PROGRESS NOTES
1150 Patient discharged per protocol in stable condition. Discharge education reviewed and packet given to patient. Patient verbalized understanding of discharge instructions. E-sign completed. Armbands removed and given to patient. No further needs reported at this time.

## 2021-02-05 NOTE — PROGRESS NOTES
Problem: Patient Education: Go to Patient Education Activity  Goal: Patient/Family Education  Outcome: Progressing Towards Goal     Problem: Pain  Goal: *Control of Pain  Outcome: Progressing Towards Goal     Problem: Patient Education: Go to Patient Education Activity  Goal: Patient/Family Education  Outcome: Progressing Towards Goal     Problem: Vaginal Delivery: Postpartum 2  Goal: Activity/Safety  Outcome: Progressing Towards Goal  Goal: Nutrition/Diet  Outcome: Progressing Towards Goal  Goal: Treatments/Interventions/Procedures  Outcome: Progressing Towards Goal  Goal: Psychosocial  Outcome: Progressing Towards Goal

## 2021-02-05 NOTE — LACTATION NOTE
Per mom, infant latching and nursing well, but was up \"all night. \" Discussed cluster feeding and second night syndrome. Breastfeeding discharge teaching completed to include feeding on demand, foremilk and hindmilk importance, engorgement, mastitis, clogged ducts, pumping, breastmilk storage, and returning to work. Information given about unit and office phone numbers and encouraged mom to reach out if concerns arise, but that [facility] would be calling her in the next few days to follow up on breastfeeding. Mom verbalized understanding and no questions at this time.

## 2021-02-05 NOTE — PROGRESS NOTES
Problem: Patient Education: Go to Patient Education Activity  Goal: Patient/Family Education  2/5/2021 0734 by Annita Ros  Outcome: Progressing Towards Goal  2/5/2021 0732 by Annita Ros  Outcome: Progressing Towards Goal     Problem: Vaginal Delivery: Postpartum 2  Goal: Activity/Safety  2/5/2021 0734 by Annita Ros  Outcome: Progressing Towards Goal  2/5/2021 0732 by Annita Ros  Outcome: Progressing Towards Goal  Goal: Consults, if ordered  2/5/2021 0734 by Annita Ros  Outcome: Progressing Towards Goal  2/5/2021 0732 by Annita Ros  Outcome: Progressing Towards Goal  Goal: Nutrition/Diet  2/5/2021 0734 by Annita Ros  Outcome: Progressing Towards Goal  2/5/2021 0732 by Annita Ros  Outcome: Progressing Towards Goal  Goal: Discharge Planning  2/5/2021 0734 by Annita Ros  Outcome: Progressing Towards Goal  2/5/2021 0732 by Annita Ros  Outcome: Progressing Towards Goal  Goal: Medications  2/5/2021 0734 by Annita Ros  Outcome: Progressing Towards Goal  2/5/2021 0732 by Annita Ros  Outcome: Progressing Towards Goal  Goal: Treatments/Interventions/Procedures  2/5/2021 0734 by Annita Ros  Outcome: Progressing Towards Goal  2/5/2021 0732 by Annita Ros  Outcome: Progressing Towards Goal  Goal: Psychosocial  2/5/2021 0734 by Annita Ros  Outcome: Progressing Towards Goal  2/5/2021 0732 by Annita Ros  Outcome: Progressing Towards Goal     Problem: Vaginal Delivery: Discharge Outcomes  Goal: *Verbalizes name, dosage, time, side effects, and number of days to continue medications  Outcome: Progressing Towards Goal  Goal: *Describes available resources and support systems  Outcome: Progressing Towards Goal  Goal: *No signs and symptoms of infection  Outcome: Progressing Towards Goal  Goal: *Birth certificate information completed  Outcome: Progressing Towards Goal  Goal: *Received and verbalizes understanding of discharge plan and instructions  Outcome: Progressing Towards Goal  Goal: *Vital signs within defined limits  Outcome: Progressing Towards Goal  Goal: *Labs within defined limits  Outcome: Progressing Towards Goal  Goal: *Hemodynamically stable  Outcome: Progressing Towards Goal  Goal: *Optimal pain control at patient's stated goal  Outcome: Progressing Towards Goal  Goal: *Participates in infant care  Outcome: Progressing Towards Goal  Goal: *Demonstrates progressive activity  Outcome: Progressing Towards Goal  Goal: *Appropriate parent-infant bonding  Outcome: Progressing Towards Goal  Goal: *Tolerating diet  Outcome: Progressing Towards Goal     Problem: Pain  Goal: *Control of Pain  Outcome: Progressing Towards Goal  Goal: *PALLIATIVE CARE:  Alleviation of Pain  Outcome: Progressing Towards Goal     Problem: Patient Education: Go to Patient Education Activity  Goal: Patient/Family Education  Outcome: Progressing Towards Goal

## 2021-02-05 NOTE — PROGRESS NOTES
1600 Received care of mother in room, no distress, call bell in reach, encouraged ambulation in hallways this pm,   1900 no  Complaints , tolerated meal  2300 BEDSIDE_VERBAL_RECORDED_WRITTEN: shift change report given to SU Rodas rn (oncoming nurse) by nolvia Johnson (offgoing nurse). Report given with SBAR, Kardex and MAR.

## 2021-02-05 NOTE — PROGRESS NOTES
2314 Bedside report received from Manolo Brizuela RN . Pt. Stable. Needs addressed. Callbell within reach. 2357 Pt. Joined at bedside by significant other and baby. AAOx4. Vital signs stable. Will continue to monitor. Pain 3/10. Educated on pain management. Pain medication administered as ordered. Whiteboard updated. Educated on plan of care and signs and symptoms to report. No further questions on concerns at this time. Fundus firm at U -1, scant rubra lochia. No clots noted. Assessment complete. Callbell within reach. Bed in lowest position. 0207 Pt resting comfortably in bed.     0355 Rounding complete. Pt resting comfortably in bed. Needs addressed. No further concerns expressed. 0551 Pt rated pain 3/10. Pain medication administered as ordered. Heating packs given. 0803 Bedside and Verbal shift change report given to Rickie Baptiste RN  (oncoming nurse) by JEWEL Lafleur RN (offgoing nurse). Report included the following information SBAR, Kardex, Intake/Output, MAR and Recent Results.

## 2021-02-05 NOTE — PROGRESS NOTES
6573 Bedside and Verbal shift change report given to Trisha Ferrara RN  (oncoming nurse) by JEWEL Carlos RN (offgoing nurse). Report included the following information SBAR, Kardex, Intake/Output, MAR and Recent Results. 5617 NYDIA Cross Aas, RN reviewed discharge instructions with the patient. The patient verbalized understanding. The patient was given the opportunity to ask questions and have all questions answered to their satisfaction. The patient was provided with a written copy of the discharge instructions. The patient expressed no further needs at this time. 0780  The patient was escorted to the front entrance of the hospital via wheelchair, and departed the premises by POV.

## 2021-02-05 NOTE — DISCHARGE SUMMARY
Discharge Summary     Patient: Columba Palacios MRN: [id number]  SSN: [id number]    YOB: 1990  Age: 31 y.o. Sex: female       Admit Date: 2/3/2021    Discharge Date: 2/4/2021      Admission Diagnoses: IUP (intrauterine pregnancy), incidental [Z33.1]  39 weeks gestation of pregnancy [Z3A.39]    Discharge Diagnoses:   Problem List as of 2/4/2021 Date Reviewed: 2/3/2021          Codes Class Noted - Resolved    Postpartum care following vaginal delivery ICD-10-CM: Z39.2  ICD-9-CM: V24.2  2/4/2021 - Present        39 weeks gestation of pregnancy ICD-10-CM: Z3A.39  ICD-9-CM: V22.2  2/3/2021 - Present        * (Principal) IUP (intrauterine pregnancy), incidental ICD-10-CM: Z33.1  ICD-9-CM: V22.2  7/19/2016 - Present               Discharge Condition: Good    Hospital Course: uncomplicated    Consults: None    Significant Diagnostic Studies: labs: wnl    Disposition: home    Discharge Medications:   Current Discharge Medication List      CONTINUE these medications which have CHANGED    Details   ibuprofen (MOTRIN) 800 mg tablet Take 1 Tab by mouth every eight (8) hours as needed for Pain. Indications: pain  Qty: 40 Tab, Refills: 0         CONTINUE these medications which have NOT CHANGED    Details   prenatal multivit-ca-min-fe-fa tab Take  by mouth. Indications: PREGNANCY             Activity: Activity as tolerated and pelvic rest  Diet: Regular Diet  Wound Care: pericare    Follow-up Appointments   Procedures    FOLLOW UP VISIT Appointment in: 6 Weeks     Standing Status:   Standing     Number of Occurrences:   1     Order Specific Question:   Appointment in     Answer:   6 Weeks       Signed By: Jocy Paredes CNM     February 4, 2021     .